# Patient Record
Sex: MALE | Race: BLACK OR AFRICAN AMERICAN | Employment: FULL TIME | URBAN - METROPOLITAN AREA
[De-identification: names, ages, dates, MRNs, and addresses within clinical notes are randomized per-mention and may not be internally consistent; named-entity substitution may affect disease eponyms.]

---

## 2017-07-15 ENCOUNTER — HOSPITAL ENCOUNTER (EMERGENCY)
Age: 27
Discharge: LEFT BEFORE DISCHARGE | End: 2017-07-15
Attending: EMERGENCY MEDICINE
Payer: SELF-PAY

## 2017-07-15 ENCOUNTER — HOSPITAL ENCOUNTER (INPATIENT)
Age: 27
LOS: 1 days | Discharge: HOME | DRG: 882 | End: 2017-07-16
Attending: PSYCHIATRY & NEUROLOGY | Admitting: STUDENT IN AN ORGANIZED HEALTH CARE EDUCATION/TRAINING PROGRAM
Payer: COMMERCIAL

## 2017-07-15 ENCOUNTER — HOSPITAL ENCOUNTER (EMERGENCY)
Age: 27
Discharge: PSYCHIATRIC OR SUBSTANCE USE FACILITY | End: 2017-07-15
Attending: EMERGENCY MEDICINE
Payer: SELF-PAY

## 2017-07-15 VITALS
DIASTOLIC BLOOD PRESSURE: 97 MMHG | TEMPERATURE: 99.2 F | HEART RATE: 98 BPM | RESPIRATION RATE: 18 BRPM | SYSTOLIC BLOOD PRESSURE: 158 MMHG | OXYGEN SATURATION: 100 %

## 2017-07-15 VITALS
RESPIRATION RATE: 16 BRPM | SYSTOLIC BLOOD PRESSURE: 118 MMHG | HEART RATE: 115 BPM | OXYGEN SATURATION: 100 % | DIASTOLIC BLOOD PRESSURE: 67 MMHG | TEMPERATURE: 98 F

## 2017-07-15 DIAGNOSIS — R45.851 SUICIDAL IDEATION: Primary | ICD-10-CM

## 2017-07-15 DIAGNOSIS — S51.812A LACERATION OF LEFT FOREARM, INITIAL ENCOUNTER: ICD-10-CM

## 2017-07-15 DIAGNOSIS — F10.920 ACUTE ALCOHOL INTOXICATION, UNCOMPLICATED (HCC): ICD-10-CM

## 2017-07-15 LAB
AMPHET UR QL SCN: NEGATIVE
ANION GAP BLD CALC-SCNC: 12 MMOL/L (ref 3–18)
BARBITURATES UR QL SCN: NEGATIVE
BASOPHILS # BLD AUTO: 0 K/UL (ref 0–0.06)
BASOPHILS # BLD: 0 % (ref 0–2)
BENZODIAZ UR QL: NEGATIVE
BUN SERPL-MCNC: 11 MG/DL (ref 7–18)
BUN/CREAT SERPL: 13 (ref 12–20)
CALCIUM SERPL-MCNC: 9.3 MG/DL (ref 8.5–10.1)
CANNABINOIDS UR QL SCN: NEGATIVE
CHLORIDE SERPL-SCNC: 107 MMOL/L (ref 100–108)
CO2 SERPL-SCNC: 22 MMOL/L (ref 21–32)
COCAINE UR QL SCN: NEGATIVE
CREAT SERPL-MCNC: 0.88 MG/DL (ref 0.6–1.3)
DIFFERENTIAL METHOD BLD: NORMAL
EOSINOPHIL # BLD: 0.1 K/UL (ref 0–0.4)
EOSINOPHIL NFR BLD: 1 % (ref 0–5)
ERYTHROCYTE [DISTWIDTH] IN BLOOD BY AUTOMATED COUNT: 13.2 % (ref 11.6–14.5)
ETHANOL SERPL-MCNC: 118 MG/DL (ref 0–3)
GLUCOSE SERPL-MCNC: 97 MG/DL (ref 74–99)
HCT VFR BLD AUTO: 41.6 % (ref 36–48)
HDSCOM,HDSCOM: NORMAL
HGB BLD-MCNC: 14.3 G/DL (ref 13–16)
LYMPHOCYTES # BLD AUTO: 25 % (ref 21–52)
LYMPHOCYTES # BLD: 2.4 K/UL (ref 0.9–3.6)
MCH RBC QN AUTO: 28 PG (ref 24–34)
MCHC RBC AUTO-ENTMCNC: 34.4 G/DL (ref 31–37)
MCV RBC AUTO: 81.4 FL (ref 74–97)
METHADONE UR QL: NEGATIVE
MONOCYTES # BLD: 0.5 K/UL (ref 0.05–1.2)
MONOCYTES NFR BLD AUTO: 5 % (ref 3–10)
NEUTS SEG # BLD: 6.5 K/UL (ref 1.8–8)
NEUTS SEG NFR BLD AUTO: 69 % (ref 40–73)
OPIATES UR QL: NEGATIVE
PCP UR QL: NEGATIVE
PLATELET # BLD AUTO: 294 K/UL (ref 135–420)
PMV BLD AUTO: 9.3 FL (ref 9.2–11.8)
POTASSIUM SERPL-SCNC: 3.6 MMOL/L (ref 3.5–5.5)
RBC # BLD AUTO: 5.11 M/UL (ref 4.7–5.5)
SODIUM SERPL-SCNC: 141 MMOL/L (ref 136–145)
WBC # BLD AUTO: 9.6 K/UL (ref 4.6–13.2)

## 2017-07-15 PROCEDURE — 99285 EMERGENCY DEPT VISIT HI MDM: CPT

## 2017-07-15 PROCEDURE — 80048 BASIC METABOLIC PNL TOTAL CA: CPT | Performed by: EMERGENCY MEDICINE

## 2017-07-15 PROCEDURE — 74011250637 HC RX REV CODE- 250/637: Performed by: EMERGENCY MEDICINE

## 2017-07-15 PROCEDURE — 80307 DRUG TEST PRSMV CHEM ANLYZR: CPT | Performed by: EMERGENCY MEDICINE

## 2017-07-15 PROCEDURE — 65220000005 HC RM SEMIPRIVATE PSYCH 3 OR 4 BED

## 2017-07-15 PROCEDURE — 85025 COMPLETE CBC W/AUTO DIFF WBC: CPT | Performed by: EMERGENCY MEDICINE

## 2017-07-15 RX ORDER — ZIPRASIDONE HYDROCHLORIDE 20 MG/1
20 CAPSULE ORAL AS NEEDED
Status: COMPLETED | OUTPATIENT
Start: 2017-07-15 | End: 2017-07-15

## 2017-07-15 RX ORDER — HYDROXYZINE PAMOATE 50 MG/1
50 CAPSULE ORAL
Status: DISCONTINUED | OUTPATIENT
Start: 2017-07-15 | End: 2017-07-16 | Stop reason: HOSPADM

## 2017-07-15 RX ORDER — LORAZEPAM 2 MG/ML
1-2 INJECTION INTRAMUSCULAR
Status: DISCONTINUED | OUTPATIENT
Start: 2017-07-15 | End: 2017-07-16 | Stop reason: HOSPADM

## 2017-07-15 RX ORDER — LORAZEPAM 1 MG/1
1-2 TABLET ORAL
Status: DISCONTINUED | OUTPATIENT
Start: 2017-07-15 | End: 2017-07-16 | Stop reason: HOSPADM

## 2017-07-15 RX ORDER — LORAZEPAM 1 MG/1
2 TABLET ORAL
Status: COMPLETED | OUTPATIENT
Start: 2017-07-15 | End: 2017-07-15

## 2017-07-15 RX ORDER — BENZTROPINE MESYLATE 1 MG/1
1-2 TABLET ORAL
Status: DISCONTINUED | OUTPATIENT
Start: 2017-07-15 | End: 2017-07-16 | Stop reason: HOSPADM

## 2017-07-15 RX ORDER — IBUPROFEN 600 MG/1
600 TABLET ORAL
Status: DISCONTINUED | OUTPATIENT
Start: 2017-07-15 | End: 2017-07-16 | Stop reason: HOSPADM

## 2017-07-15 RX ORDER — HALOPERIDOL 5 MG/ML
5 INJECTION INTRAMUSCULAR
Status: DISCONTINUED | OUTPATIENT
Start: 2017-07-15 | End: 2017-07-16 | Stop reason: HOSPADM

## 2017-07-15 RX ORDER — BENZTROPINE MESYLATE 1 MG/ML
1-2 INJECTION INTRAMUSCULAR; INTRAVENOUS
Status: DISCONTINUED | OUTPATIENT
Start: 2017-07-15 | End: 2017-07-16 | Stop reason: HOSPADM

## 2017-07-15 RX ORDER — TRAZODONE HYDROCHLORIDE 50 MG/1
50 TABLET ORAL
Status: DISCONTINUED | OUTPATIENT
Start: 2017-07-15 | End: 2017-07-16 | Stop reason: HOSPADM

## 2017-07-15 RX ORDER — HALOPERIDOL 5 MG/1
5 TABLET ORAL
Status: DISCONTINUED | OUTPATIENT
Start: 2017-07-15 | End: 2017-07-16 | Stop reason: HOSPADM

## 2017-07-15 RX ADMIN — ZIPRASIDONE HYDROCHLORIDE 20 MG: 20 CAPSULE ORAL at 14:23

## 2017-07-15 RX ADMIN — LORAZEPAM 2 MG: 1 TABLET ORAL at 14:23

## 2017-07-15 NOTE — ED NOTES
Maru MELÉNDEZ in department. Officers state they did not see patient along [address].  Officers states they will attempt to locate patient in neighborhoods surrounding hospital.

## 2017-07-15 NOTE — IP AVS SNAPSHOT
[address] Patient: Magui Hood MRN: [id number] :1990 You are allergic to the following No active allergies Recent Documentation Smoking Status Current Every Day Smoker Emergency Contacts Name Discharge Info Relation Home Work Mobile Karla Arauz DISCHARGE CAREGIVER [3] Other Relative [6] [phone] About your hospitalization You were admitted on:  July 15, 2017 You last received care in the:  [facility] You were discharged on:  2017 Unit phone number:  [phone] Why you were hospitalized Your primary diagnosis was:  Not on File Your diagnoses also included: Adjustment Disorder Providers Seen During Your Hospitalizations Provider Role Specialty Primary office phone Christine Bell MD Attending Provider Psychiatry [phone] Your Primary Care Physician (PCP) Primary Care Physician Office Phone Office Fax NONE ** None ** ** None ** Follow-up Information Follow up With Details Comments Contact Info Pt given address and phone # to the [facility] to follow up if needed. [place] [address] [address] [facility] [phone]. Pt given card with numbers to remember. Current Discharge Medication List  
  
START taking these medications Dose & Instructions Dispensing Information Comments Morning Noon Evening Bedtime  
 bacitracin ointment Commonly known as:  BACITRACIN Your last dose was: Your next dose is:    
   
   
 Apply  to affected area two (2) times a day. Indications: skin abrasion Quantity:  15 g Refills:  0 Where to Get Your Medications Information on where to get these meds will be given to you by the nurse or doctor. ! Ask your nurse or doctor about these medications  
  bacitracin ointment Discharge Instructions BEHAVIORAL HEALTH NURSING DISCHARGE NOTE The following personal items collected during your admission are returned to you:  
Dental Appliance: Dental Appliances: None Vision: Visual Aid: None Hearing Aid:   
Jewelry: Jewelry: None Clothing: Clothing: Footwear, Pants, Shirt Other Valuables: Other Valuables: Other (comment) (credit cards) Valuables sent to safe:   
 
 
PATIENT INSTRUCTIONS: 
 
 
 
Regular diet The discharge information has been reviewed with the patient. The patient verbalized understanding. Patient armband removed and shredded Discharge Orders None Introducing [facility]! Tyesha Pinon introduces [employer] patient portal. Now you can access parts of your medical record, email your doctor's office, and request medication refills online. 1. In your internet browser, go to [email or web address] 2. Click on the First Time User? Click Here link in the Sign In box. You will see the New Member Sign Up page. 3. Enter your [employer] Access Code exactly as it appears below. You will not need to use this code after youve completed the sign-up process. If you do not sign up before the expiration date, you must request a new code. · [employer] Access Code: [email or web address] Expires: 10/14/2017 10:24 AM 
 
4. Enter the last four digits of your Social Security Number (xxxx) and Date of Birth (mm/dd/yyyy) as indicated and click Submit. You will be taken to the next sign-up page. 5. Create a [employer] ID. This will be your [employer] login ID and cannot be changed, so think of one that is secure and easy to remember. 6. Create a [employer] password. You can change your password at any time. 7. Enter your Password Reset Question and Answer. This can be used at a later time if you forget your password. 8. Enter your e-mail address. You will receive e-mail notification when new information is available in [address] 9. Click Sign Up. You can now view and download portions of your medical record. 10. Click the Download Summary menu link to download a portable copy of your medical information. If you have questions, please visit the Frequently Asked Questions section of the [employer] website. Remember, [employer] is NOT to be used for urgent needs. For medical emergencies, dial 911. Now available from your iPhone and Android! General Information Please provide this summary of care documentation to your next provider. Patient Signature:  ____________________________________________________________ Date:  ____________________________________________________________  
  
[facility] Provider Signature:  ____________________________________________________________ Date:  ____________________________________________________________

## 2017-07-15 NOTE — IP AVS SNAPSHOT
Current Discharge Medication List  
  
START taking these medications Dose & Instructions Dispensing Information Comments Morning Noon Evening Bedtime  
 bacitracin ointment Commonly known as:  BACITRACIN Your last dose was: Your next dose is:    
   
   
 Apply  to affected area two (2) times a day. Indications: skin abrasion Quantity:  15 g Refills:  0 Where to Get Your Medications Information on where to get these meds will be given to you by the nurse or doctor. ! Ask your nurse or doctor about these medications  
  bacitracin ointment

## 2017-07-15 NOTE — ED NOTES
Pt admits to ETOH, states he doesn't know how much he drank, was just drinking vodka straight from the bottle.

## 2017-07-15 NOTE — ED NOTES
Received patient report from [email or web address]. Assuming care of patient at this time. Patient in bed with [employer] at bedside. Sitter at bedside.

## 2017-07-15 NOTE — ED PROVIDER NOTES
[facility] EMERGENCY DEPT      24 y.o. male with noted past medical history who presents to the emergency department by EMS and [employer] for self-inflicted laceration to left forearm. Per patient, he cut himself with a knife. He admits to self harm intention earlier today, but denies self harm intention during evaluation. He admits to drinking tonight. Patient denies history of depression or auditory hallucination. Patient is willing to accept intervention for mental health issue. Last tdap unknown. No other complaints. No current facility-administered medications for this encounter. No current outpatient prescriptions on file. History reviewed. No pertinent past medical history. Past Surgical History:   Procedure Laterality Date    HX APPENDECTOMY         History reviewed. No pertinent family history. Social History     Social History    Marital status: SINGLE     Spouse name: N/A    Number of children: N/A    Years of education: N/A     Occupational History    Not on file. Social History Main Topics    Smoking status: Current Every Day Smoker    Smokeless tobacco: Never Used    Alcohol use Yes    Drug use: No    Sexual activity: Not on file     Other Topics Concern    Not on file     Social History Narrative    No narrative on file       No Known Allergies    Patient's primary care provider (as noted in EPIC):  None    REVIEW OF SYSTEMS:    Constitutional:  Negative for diaphoresis. HENT:  Negative for congestion. Respiratory:  Negative for cough and shortness of breath. Cardiovascular:  Negative for chest pain and palpitations. Gastrointestinal:  Negative for diarrhea. Genitourinary:  Negative for flank pain. Musculoskeletal:  Negative for back pain. Skin:  Negative for pallor. Positive for laceration on left forearm. Neurological:  Negative for focal numbness, weakness or tingling. Negative for slurred speech.     Visit Vitals    BP (!) 158/97    Pulse 98  Temp 99.2 °F (37.3 °C)    Resp 18    SpO2 100%       PHYSICAL EXAM:    CONSTITUTIONAL:  Alert, in no apparent distress;  well developed;  well nourished. HEAD:  Normocephalic, atraumatic. EYES:  EOMI. Non-icteric sclera. Normal conjunctiva. ENTM:  Nose:  no rhinorrhea. Throat:  no erythema or exudate, mucous membranes moist.  NECK:  No JVD. Supple  RESPIRATORY:  Chest clear, equal breath sounds, good air movement. CARDIOVASCULAR:  Regular rate and rhythm. No murmurs, rubs, or gallops. GI:  Normal bowel sounds, abdomen soft and non-tender. No rebound or guarding. BACK:  Non-tender. UPPER EXT:  Normal inspection. Left anterior proximal forearm has 3.0 cm superficial laceration. Bleeding controlled. LOWER EXT:  No edema, no calf tenderness. Distal pulses intact. NEURO:  Moves all four extremities, and grossly normal motor exam.  SKIN:  No rashes;  Normal for age. PSYCH:  Alert and normal affect. DIFFERENTIAL DIAGNOSES/ MEDICAL DECISION MAKING:   Differential diagnoses/impression: Need to rule out obvious organic causes versus psychological etiology. Based on patient's presentation and lab work, I do not believe that there is an obvious organic etiology for the patient's suicidal ideation. I believe the patient needs psychiatric evaluation and treatment for the suicidal ideation.       ED COURSE:      Abnormal lab results from this emergency department encounter:  Labs Reviewed   ETHYL ALCOHOL - Abnormal; Notable for the following:        Result Value    ALCOHOL(ETHYL),SERUM 118 (*)     All other components within normal limits   CBC WITH AUTOMATED DIFF   METABOLIC PANEL, BASIC   DRUG SCREEN, URINE       Lab values for this patient within approximately the last 12 hours:  Recent Results (from the past 12 hour(s))   DRUG SCREEN, URINE    Collection Time: 07/15/17  4:30 AM   Result Value Ref Range    BENZODIAZEPINE NEGATIVE  NEG      BARBITURATES NEGATIVE  NEG      THC (TH-CANNABINOL) NEGATIVE  NEG      OPIATES NEGATIVE  NEG      PCP(PHENCYCLIDINE) NEGATIVE  NEG      COCAINE NEGATIVE  NEG      AMPHETAMINES NEGATIVE  NEG      METHADONE NEGATIVE       HDSCOM (NOTE)    CBC WITH AUTOMATED DIFF    Collection Time: 07/15/17  5:22 AM   Result Value Ref Range    WBC 9.6 4.6 - 13.2 K/uL    RBC 5.11 4.70 - 5.50 M/uL    HGB 14.3 13.0 - 16.0 g/dL    HCT 41.6 36.0 - 48.0 %    MCV 81.4 74.0 - 97.0 FL    MCH 28.0 24.0 - 34.0 PG    MCHC 34.4 31.0 - 37.0 g/dL    RDW 13.2 11.6 - 14.5 %    PLATELET 895 813 - 243 K/uL    MPV 9.3 9.2 - 11.8 FL    NEUTROPHILS 69 40 - 73 %    LYMPHOCYTES 25 21 - 52 %    MONOCYTES 5 3 - 10 %    EOSINOPHILS 1 0 - 5 %    BASOPHILS 0 0 - 2 %    ABS. NEUTROPHILS 6.5 1.8 - 8.0 K/UL    ABS. LYMPHOCYTES 2.4 0.9 - 3.6 K/UL    ABS. MONOCYTES 0.5 0.05 - 1.2 K/UL    ABS. EOSINOPHILS 0.1 0.0 - 0.4 K/UL    ABS. BASOPHILS 0.0 0.0 - 0.06 K/UL    DF AUTOMATED     METABOLIC PANEL, BASIC    Collection Time: 07/15/17  5:22 AM   Result Value Ref Range    Sodium 141 136 - 145 mmol/L    Potassium 3.6 3.5 - 5.5 mmol/L    Chloride 107 100 - 108 mmol/L    CO2 22 21 - 32 mmol/L    Anion gap 12 3.0 - 18 mmol/L    Glucose 97 74 - 99 mg/dL    BUN 11 7.0 - 18 MG/DL    Creatinine 0.88 0.6 - 1.3 MG/DL    BUN/Creatinine ratio 13 12 - 20      GFR est AA >60 >60 ml/min/1.73m2    GFR est non-AA >60 >60 ml/min/1.73m2    Calcium 9.3 8.5 - 10.1 MG/DL   ETHYL ALCOHOL    Collection Time: 07/15/17  5:22 AM   Result Value Ref Range    ALCOHOL(ETHYL),SERUM 118 (H) 0 - 3 MG/DL       Radiologist and cardiologist interpretations if available at time of this note:  No results found. Medication(s) ordered for patient during this emergency visit encounter:  Medications - No data to display    ED COURSE:  The patient has no active medical issues. I believe that the patient is medically cleared for admission to a psychiatric unit if this is deemed appropriate by the crisis staff after their evaluation of the patient.      Patient eloped before laceration could be repaired and before Tele-psych evaluation. IMPRESSION AND MEDICAL DECISION MAKING:  Based upon the patient's presentation with noted HPI and PE, along with the work up done in the emergency department, I believe that the patient is having suicidal ideation that MAY require admission and further evaluation on a psychiatric/ behavioral medicine unit. Condition:  Stable    DIAGNOSIS:  1. Suicidal ideation. 2.  Laceration, left forearm. 3.  Acute alcohol intoxication. 4.  Eloped patient. PLAN:   1. Eloped. STEVENSON Fox, acting as a scribe for and in the presence of Dr. Jose Marquis M.D  Signed By: Shaun Jorgensen, scribe for Dr. Jose Marquis M.D    Provider Attestation:  If a scribe was utilized in generation of this patient record, I personally performed the services described in the documentation, reviewed the documentation, as recorded by the scribe in my presence, and it accurately records the patient's history of presenting illness, review of systems, patient physical examination, and procedures performed by me as the attending physician. Satr Babcock M.D.   AB Board Certified Emergency Physician  7/15/2017.  04:41 AM

## 2017-07-15 NOTE — ED NOTES
Pt changes into paper scrubs which were small on him so he was provided with a gown. Pt belongings bagged, checked with security, and placed into locker #1.

## 2017-07-15 NOTE — ED TRIAGE NOTES
Patient returned to collect belongings. Per pm charge nurse that patient reported that he cut his arm to harm himself and will jump off of a bridge the next time because the cut was too painful. Per pm nurse Josy Rivas the patient also states he hopes he dies from an infection that from the cut on his arm and patient eloped during night shift. PD was called per night she staff  These concerns were discussed the patient and the patient states \"this is my life and if I want to kill myself I can\". Patient demands his belongings he left last pm. This was discussed with the emergency attending Dr. Quinn Prado.  PD called for possible ECO and patient placed in EKG room with security standing by awaiting PD arrival.

## 2017-07-15 NOTE — IP AVS SNAPSHOT
Summary of Care Report The Summary of Care report has been created to help improve care coordination. Users with access to [employer] or [address] (Web-based application) may access additional patient information including the Discharge Summary. If you are not currently a [address] user and need more information, please call the number listed below in the [place] [address] section and ask to be connected with Medical Records. Facility Information Name Address Phone [address] [facility]  [address] [facility] [address] [phone] Patient Information Patient Name Sex KRYSTA Pantoja ([id number]) Male 1990 Discharge Information Admitting Provider Service Area Unit Jo Klein MD / [address] [phone] Discharge Provider Discharge Date/Time Discharge Disposition Destination (none) 2017 (Pending) AHR (none) Patient Language Language ENGLISH [13] Hospital Problems as of 2017  Never Reviewed Class Noted - Resolved Last Modified POA Active Problems Adjustment disorder  2017 - Present 2017 by Jo Klein MD Unknown Entered by Jo Klein MD  
  
You are allergic to the following No active allergies Current Discharge Medication List  
  
START taking these medications Dose & Instructions Dispensing Information Comments  
 bacitracin ointment Commonly known as:  BACITRACIN Apply  to affected area two (2) times a day. Indications: skin abrasion Quantity:  15 g Refills:  0 Follow-up Information Follow up With Details Comments Contact Info Pt given address and phone # to the [facility] to follow up if needed. [place] [phone] [facility] [phone]. Pt given card with numbers to remember. Discharge Instructions BEHAVIORAL HEALTH NURSING DISCHARGE NOTE The following personal items collected during your admission are returned to you:  
Dental Appliance: Dental Appliances: None Vision: Visual Aid: None Hearing Aid:   
Jewelry: Jewelry: None Clothing: Clothing: Footwear, Pants, Shirt Other Valuables: Other Valuables: Other (comment) (credit cards) Valuables sent to safe:   
 
 
PATIENT INSTRUCTIONS: 
 
 
 
Regular diet The discharge information has been reviewed with the patient. The patient verbalized understanding. Patient armband removed and shredded Chart Review Routing History No Routing History on File

## 2017-07-15 NOTE — ED NOTES
Patient refusing to change into scrubs. [employer] at bedside checked patient for any weapons. Karla MELO charge nurse aware patient is refusing scrubs. Patient currently under ECO and police will remain at bedside with patient.  Sitter also at bedside

## 2017-07-15 NOTE — ED NOTES
Pt walked out of ER and was pacing in front of ambulance bay doors. Security escorted pt back inside. Pt states Justyna Boyd is taking so long to get me out of here? I've been here for like 30 minutes already. \" Pt being rude and argumentative with staff stating he is just emotional and there is nothing wrong with him.

## 2017-07-15 NOTE — ED NOTES
Lisa West taken out of belongings bag in locker #1 and handed to Courtney Kumar. With permission from pt, pt states it is his fathers truck.  [place] given back into possession of owner of truck

## 2017-07-15 NOTE — ED NOTES
Per Flora Syed from [facility] the patient will be TDO'd and [facility] will hold a male bed pending documentation and medical clearance

## 2017-07-15 NOTE — ED PROVIDER NOTES
HPI Comments: 9:19 AM George Bridges is a 24 y.o. male with no known past medical hx who presents to the ED ECO for suicidal ideation. The pt was seen in the ED last night for cutting his right forearm to harm himself. While in the ED he stated that the next time he was going to jump off a bridge because cutting his arm hurt too much. The pt eloped during the night and returned this morning to  belongings but [employer] was called. The patient denies current SI but does confirm that he tried to harm himself by cutting his arm. He reports ETOH consumption since elopement. The history is provided by the patient. No past medical history on file. Past Surgical History:   Procedure Laterality Date    HX APPENDECTOMY           No family history on file. Social History     Social History    Marital status: SINGLE     Spouse name: N/A    Number of children: N/A    Years of education: N/A     Occupational History    Not on file. Social History Main Topics    Smoking status: Current Every Day Smoker    Smokeless tobacco: Never Used    Alcohol use Yes    Drug use: No    Sexual activity: Not on file     Other Topics Concern    Not on file     Social History Narrative    No narrative on file         ALLERGIES: Review of patient's allergies indicates no known allergies. Review of Systems   Constitutional: Negative for diaphoresis and fever. HENT: Negative for ear pain, rhinorrhea and trouble swallowing. Eyes: Negative for visual disturbance. Respiratory: Negative for cough and shortness of breath. Cardiovascular: Negative for chest pain and leg swelling. Gastrointestinal: Negative for abdominal pain, blood in stool, diarrhea, nausea and vomiting. Genitourinary: Negative for difficulty urinating, flank pain and hematuria. Musculoskeletal: Negative for back pain and neck pain. Skin: Negative for rash. Neurological: Negative for dizziness, weakness, numbness and headaches. Hematological: Negative. Psychiatric/Behavioral: Negative. All other systems reviewed and are negative. Vitals:    07/15/17 0905   BP: 119/73   Pulse: 99   Resp: 18   Temp: 98.3 °F (36.8 °C)            Physical Exam   Constitutional: He is oriented to person, place, and time. He appears well-developed and well-nourished. No distress. HENT:   Head: Normocephalic and atraumatic. Mouth/Throat: Oropharynx is clear and moist.   Eyes: Conjunctivae and EOM are normal. Pupils are equal, round, and reactive to light. No scleral icterus. Neck: Normal range of motion. Neck supple. Cardiovascular: Normal rate, regular rhythm and normal heart sounds. No murmur heard. Pulmonary/Chest: Effort normal and breath sounds normal. No respiratory distress. Abdominal: Soft. Bowel sounds are normal. He exhibits no distension. There is no tenderness. Musculoskeletal: He exhibits no edema. 2 cm superficial laceration roughly 10 hours old   Lymphadenopathy:     He has no cervical adenopathy. Neurological: He is alert and oriented to person, place, and time. Coordination normal.   Skin: Skin is warm and dry. No rash noted. Psychiatric: He has a normal mood and affect. His behavior is normal.   Nursing note and vitals reviewed. MDM  Number of Diagnoses or Management Options  Suicidal ideation:   Diagnosis management comments: No past psych hx however increased si last night with laceration to L forearm stated he was going to jump off bridge eloped last night however returned early this am ECO enacted    Pt to be T[facility] to [facility] for further care     ED Course       Procedures      Vitals:  Patient Vitals for the past 12 hrs:   Temp Pulse Resp BP   07/15/17 0905 98.3 °F (36.8 °C) 99 18 119/73   99% on RA, indicating adequate oxygenation.         Medications ordered:   Medications   ziprasidone (GEODON) capsule 20 mg (20 mg Oral Given 7/15/17 1423)   LORazepam (ATIVAN) tablet 2 mg (2 mg Oral Given 7/15/17 1423) Lab findings:  Recent Results (from the past 12 hour(s))   DRUG SCREEN, URINE    Collection Time: 07/15/17  4:30 AM   Result Value Ref Range    BENZODIAZEPINE NEGATIVE  NEG      BARBITURATES NEGATIVE  NEG      THC (TH-CANNABINOL) NEGATIVE  NEG      OPIATES NEGATIVE  NEG      PCP(PHENCYCLIDINE) NEGATIVE  NEG      COCAINE NEGATIVE  NEG      AMPHETAMINES NEGATIVE  NEG      METHADONE NEGATIVE       HDSCOM (NOTE)    CBC WITH AUTOMATED DIFF    Collection Time: 07/15/17  5:22 AM   Result Value Ref Range    WBC 9.6 4.6 - 13.2 K/uL    RBC 5.11 4.70 - 5.50 M/uL    HGB 14.3 13.0 - 16.0 g/dL    HCT 41.6 36.0 - 48.0 %    MCV 81.4 74.0 - 97.0 FL    MCH 28.0 24.0 - 34.0 PG    MCHC 34.4 31.0 - 37.0 g/dL    RDW 13.2 11.6 - 14.5 %    PLATELET 779 421 - 551 K/uL    MPV 9.3 9.2 - 11.8 FL    NEUTROPHILS 69 40 - 73 %    LYMPHOCYTES 25 21 - 52 %    MONOCYTES 5 3 - 10 %    EOSINOPHILS 1 0 - 5 %    BASOPHILS 0 0 - 2 %    ABS. NEUTROPHILS 6.5 1.8 - 8.0 K/UL    ABS. LYMPHOCYTES 2.4 0.9 - 3.6 K/UL    ABS. MONOCYTES 0.5 0.05 - 1.2 K/UL    ABS. EOSINOPHILS 0.1 0.0 - 0.4 K/UL    ABS.  BASOPHILS 0.0 0.0 - 0.06 K/UL    DF AUTOMATED     METABOLIC PANEL, BASIC    Collection Time: 07/15/17  5:22 AM   Result Value Ref Range    Sodium 141 136 - 145 mmol/L    Potassium 3.6 3.5 - 5.5 mmol/L    Chloride 107 100 - 108 mmol/L    CO2 22 21 - 32 mmol/L    Anion gap 12 3.0 - 18 mmol/L    Glucose 97 74 - 99 mg/dL    BUN 11 7.0 - 18 MG/DL    Creatinine 0.88 0.6 - 1.3 MG/DL    BUN/Creatinine ratio 13 12 - 20      GFR est AA >60 >60 ml/min/1.73m2    GFR est non-AA >60 >60 ml/min/1.73m2    Calcium 9.3 8.5 - 10.1 MG/DL   ETHYL ALCOHOL    Collection Time: 07/15/17  5:22 AM   Result Value Ref Range    ALCOHOL(ETHYL),SERUM 118 (H) 0 - 3 MG/DL       EKG interpretation by ED Physician:      X-Ray, CT or other radiology findings or impressions:  No orders to display       Progress notes, Consult notes or additional Procedure notes:   10:00 AM Pt seen by [facility] and pursuing TDO. Reevaluation of patient:       Disposition:  Diagnosis:   1. Suicidal ideation        Disposition: Transfer to [facility] TDO    Follow-up Information     None           Patient's Medications    No medications on file         SCRIBE ATTESTATION STATEMENT  Documented by: Nilesh Ambriz scribing for, and in the presence of, Alfredo Faria MD 07/15/17 3:55 PM     Signed by: Vijay Ardon, 07/15/17 10:00 AM     PROVIDER ATTESTATION STATEMENT  I personally performed the services described in the documentation, reviewed the documentation, as recorded by the scribe in my presence, and it accurately and completely records my words and actions.   Alfredo Faria MD

## 2017-07-15 NOTE — BH NOTES
Pt admitted as [facility] T[facility] from [facility] after a suicidal gesture of cutting left forearm. Pt arrived to unit accompanied by security. Pt compliant with admission process. Pt malodorous. Pt is guarded and appears to be minimizing ETOH intake and suicidal gesture. Pt stated recent stressor is his daughter's mother recently moved daughter to [place] and told him he had no rights. Pt denies history of depression and denies psych history. Pt denies history of abuse. Pt stated he consumes \"one cup of vodka daily. \" Pt denies previous history of suicide attempts. Pt denies history of aggressive behaviors. Pt does endorse history of GERD; denies allergies and daily medications. Pt has small self inflicted laceration to left upper forearm about an inch in width. Steri strips in place. No signs and symptoms of infection present. Pt stated he used a \"regular knife\"     Pt was oriented to unit rules and expectations and verbalized understanding. TDO process reviewed with pt who verbalized understanding. Safety search for contraband conducted. Pt placed on suicide precautions where rounds will be conducted Q 15 mins for safety.

## 2017-07-15 NOTE — ED TRIAGE NOTES
Pt presents by EMS and [employer] with self inflicted laceration to L forearm. States he has been dealing with depression for about 3 years now but is unable to [facility] a psychiatrist like you white people. \" Pt states he thought of several ways to end his life including hanging himself and jumping off a bridge \"but they all sound painful so I'm good. \" Pt states this attempt has made him realize he doesn't want to hurt himself after all. Denies HI. States he doesn't want to stay here.

## 2017-07-15 NOTE — ED NOTES
CSB at bedside.  Patients keys taken by [employer] at this time and placed in patients belonging bag in LOCKER #1

## 2017-07-16 VITALS
SYSTOLIC BLOOD PRESSURE: 141 MMHG | DIASTOLIC BLOOD PRESSURE: 82 MMHG | TEMPERATURE: 97.6 F | RESPIRATION RATE: 19 BRPM | HEART RATE: 81 BPM

## 2017-07-16 PROBLEM — F43.20 ADJUSTMENT DISORDER: Status: ACTIVE | Noted: 2017-07-16

## 2017-07-16 RX ORDER — BACITRACIN ZINC 500 UNIT/G
OINTMENT (GRAM) TOPICAL 2 TIMES DAILY
Status: DISCONTINUED | OUTPATIENT
Start: 2017-07-16 | End: 2017-07-16 | Stop reason: HOSPADM

## 2017-07-16 RX ORDER — BACITRACIN ZINC 500 UNIT/G
OINTMENT (GRAM) TOPICAL 2 TIMES DAILY
Qty: 15 G | Refills: 0 | Status: SHIPPED | OUTPATIENT
Start: 2017-07-16

## 2017-07-16 NOTE — H&P
Psychiatry History and Physical    Subjective:     Date of Evaluation:  7/16/2017    Reason for Referral:  Kristine Sen was referred to the examiners from ER/[facility] for suicidal gesture-cut self    History of Presenting Problem: 26y/o AA male in NAD well developed and nourished. TDO admit. Denies ETOH problems or SI. Medical problems; Depression, cut self to left FA-antecubital area    There are no active problems to display for this patient. No past medical history on file. Past Surgical History:   Procedure Laterality Date    HX APPENDECTOMY         No family history on file.    Social History   Substance Use Topics    Smoking status: Current Every Day Smoker    Smokeless tobacco: Never Used    Alcohol use Yes     Prior to Admission medications    Not on File     No Known Allergies     Review of Systems - History obtained from chart review and the patient  General ROS: positive for  - sleep disturbance  Psychological ROS: positive for - depression and sleep disturbances  Respiratory ROS: no cough, shortness of breath, or wheezing  Cardiovascular ROS: no chest pain or dyspnea on exertion  Gastrointestinal ROS: no abdominal pain, change in bowel habits, or black or bloody stools  Genito-Urinary ROS: no dysuria, trouble voiding, or hematuria  Musculoskeletal ROS: negative  Neurological ROS: no TIA or stroke symptoms  Dermatological ROS: negative      Objective:     Patient Vitals for the past 8 hrs:   BP Temp Pulse Resp   07/16/17 0745 141/82 97.6 °F (36.4 °C) 81 19       Mental Status exam: WNL except for    Sensorium  oriented to time, place and person   Orientation person, place, time/date and situation   Relations cooperative   Eye Contact appropriate   Appearance:  age appropriate and within normal Limits   Motor Behavior:  gait stable and within normal limits   Speech:  normal volume   Vocabulary average   Thought Process: logical   Thought Content free of delusions and free of hallucinations   Suicidal ideations no plan  and no intention   Homicidal ideations no plan  and no intention   Mood:  depressed   Affect:  depressed   Memory recent  adequate   Memory remote:  adequate   Concentration:  adequate   Abstraction:  concrete   Insight:  good   Reliability good   Judgment:  good           Physical Exam:   Visit Vitals    /82    Pulse 81    Temp 97.6 °F (36.4 °C)    Resp 19     General appearance: alert, cooperative, no distress, appears stated age  Head: Normocephalic, without obvious abnormality, atraumatic  Eyes: negative  Ears: normal TM's and external ear canals AU  Nose: Nares normal. Septum midline. Mucosa normal. No drainage or sinus tenderness. Throat: Lips, mucosa, and tongue normal. Teeth and gums normal  Neck: supple, symmetrical, trachea midline, no adenopathy, thyroid: not enlarged, symmetric, no tenderness/mass/nodules, no carotid bruit and no JVD  Back: symmetric, no curvature. ROM normal. No CVA tenderness. Lungs: clear to auscultation bilaterally  Chest wall: no tenderness  Heart: regular rate and rhythm, S1, S2 normal, no murmur, click, rub or gallop  Abdomen: soft, non-tender. Bowel sounds normal. No masses,  no organomegaly  Extremities: extremities normal, atraumatic, no cyanosis or edema  Pulses: 2+ and symmetric  Skin: Skin color, texture, turgor normal. No rashes or lesions or superficial lac to left antecubital area-has 2 steri strips noted-intact  Lymph nodes: Cervical, supraclavicular, and axillary nodes normal.  Neurologic: Grossly normal        Impression:    Active Problems:    * No active hospital problems. *        Plan:     Recommendations for Treatment/Conditions:  Psychiatric treatment recommended while in hospital  Admit to Psych services for Suicidal gesture-cut self                                                  Depression    Referral To: Inpatient psychiatric care    Competency Statement:    It is recommended that the family or other concerned individuals be consulted for substituted judgement if deemed incompetent.  [email or web address]      [employer], NP   7/16/2017 9:07 AM

## 2017-07-16 NOTE — H&P
Psychiatric Intake Note    Date: 17   Patient Name: George Bridges  : 1990  MRN: [id number]  Hospital Day: 2    CC: \"I'm ready to go home. \"    HISTORY OF PRESENT ILLNESS:   Patient is a 25 yo AAM with no past psych hx who presents on TDO by police for suicide attempt of superficial laceration to upper L forearm in context of alcohol use and fight with girlfriend. Patient this morning is doing well, calm, cooperative. He says he \"needs a cigarette\" and \"needs to get out of here. \" He says he was frustrated that he's been providing for girlfriend and two kids for past 3 years and asked girlfriend to make one week's rent yesterday and she \"flipped out and up and left\" him taking the two children to [place]. Of note, patient spoke with girlfriend over the phone while on the unit last night and it was a calm conversation. Patient says he was \"overwhelmed\" and started drinking and just \"wanted to feel\" and so, cut himself, but stopped \"because it hurt. \" He denies wanting to end his life and says he's never done anything like this before. He says it was \"stupid\" and now, he just wants to go home. Denies SI/HI at this time. Patient is future oriented, says he's enrolled in school to further education and wants to get back to work so he can make enough money to live on his own again. For now, he will go stay with father and grandmother     Outside of event yesterday, has been sleeping well, eating well, denies depressive ssx, no psychosis, no prabhakar. No hx of impulsivity/ violence/ suicide/ self injurious behaviors. Denies illicit substance use. Spoke with patient's father who confirms that patient has never done anything like this before. He's \"usually very level-headed. I was concerned when I heard about it. This isn't like him at all. \" The father confirms that patient is to stay with him at time of discharge and father and grandmother are home today to spend time with father.       PSYCHIATRIC HISTORY:  Never seen mental health providers, therapist, psychiatrist before  No hospitalizations  No hx of impulsivity/ violence/ suicide/ self injurious behaviors. Denies illicit substance use. PAST MEDICAL/ SURGICAL HISTORY:  None     ALLERGIES: NKDA    FAMILY HISTORY OF MENTAL ILLNESS:   Mother side: unknown  Father's side: unknown  Siblings: unknown    SOCIAL HISTORY:  Current Living Situation: was living in rental, but now going to stay with father and grandmother  Relationship status:  from gf recently  Children: has 1 stepchild, 1 bio child  Employment: works as   Education: h.s.  GED  Legal: none  Abuse History: denies    SUBSTANCE USE:  Remote hx of cannabis use, drinks 1-2 drinks/ week (if that, he says)    REVIEW OF SYSTEMS: reviewed 10 organ systems- negative, except as noted in HPI    Patient Vitals for the past 24 hrs:   Temp Pulse Resp BP SpO2   07/06/17 0758 98.3 °F (36.8 °C) (!) 103 18 (!) 166/115 -   07/05/17 1958 98 °F (36.7 °C) 100 18 (!) 158/114 -   07/05/17 1322 99 °F (37.2 °C) (!) 103 18 (!) 159/116 99 %       MENTAL STATUS EXAM:  Appearance: Dressed in casual clothes with fair grooming and hygiene  Behavior: Cooperative with good eye contact  Motor: No psychomotor agitation/retardation  Speech: Normal rate, tone and volume  Mood: \"good now\"  Affect: euthymic, calm  Thought Process: linear, goal-directed  Thought Content: Denies SI and HI  Perception: Denies AH or VH  Concentration: fair  Memory: fair  Cognition: Alert and oriented  Insight: fair  Judgment: fair    RISK ASSESSMENT:   Prior Attempts: no noted prior  Lethality of Attempts: none noted prior  Current Ideation/Plan: no  Weapons at Home: no  Alcohol/Drug Use: +Etoh on arrival  Protective Factors: limited, no supportive family or peer support  Future Orientation: yes    ASSESSMENT: Patient is a 23 yo AAM with no past psych hx who presents on TDO by police for suicide attempt of superficial laceration to upper L forearm in context of alcohol use and fight with girlfriend. Patient's behavior largely explained by acute stressor of girlfriend walking out and in the context of alcohol use. Risk factors include recent self injurious behavior, male gender, and relationship separation; though protective factors include good support system in family, has housing, has full time job, future orientation, no hx of psych disorders, no hx of self injurious behaviors/ suicide attempts/ suicidal ideations, and strong Denominational convictions forbidding suicide as an option. Additionally, father is willing to  patient today, hide knives in home and all weapons and pills. Diagnoses:  Adjustment disorder   Alcohol use disorder    Plan:  - Pt requests discharge, not willing to take meds or f/u with outpatient psychiatrist because he \"doesn't believe\" in mental illness; at this time, father feels safe with patient returning home and no active ssx of mood or psychotic disorder  - Dispo- d/c patient home with father, provide resources for outpatient behavioral health    Jacqueline Quigley MD

## 2017-07-16 NOTE — DISCHARGE INSTRUCTIONS
BEHAVIORAL HEALTH NURSING DISCHARGE NOTE      The following personal items collected during your admission are returned to you:   Dental Appliance: Dental Appliances: None  Vision: Visual Aid: None  Hearing Aid:    Jewelry: Jewelry: None  Clothing: Clothing: Footwear, Pants, Shirt  Other Valuables: Other Valuables: Other (comment) (credit cards)  Valuables sent to safe:        PATIENT INSTRUCTIONS:        Regular diet        The discharge information has been reviewed with the patient. The patient verbalized understanding.       Patient armband removed and shredded

## 2017-07-16 NOTE — BH NOTES
Discharge instructions explained, copy given to pt. Discharged at [phone] with his belongings. Pt father picking him up, waiting in the lobby.

## 2017-07-17 NOTE — DISCHARGE SUMMARY
Psychiatric Discharge Summary    Date: 17   Patient Name: Julianna King  : 1990  MRN: [id number]  Admission Date: 7/15/2017  Discharge Date:     HISTORY OF PRESENT ILLNESS:   Patient is a 25 yo AAM with no past psych hx who presents on TDO by police for suicide attempt of superficial laceration to upper L forearm in context of alcohol use and fight with girlfriend.     Patient this morning is doing well, calm, cooperative. He says he \"needs a cigarette\" and \"needs to get out of here. \" He says he was frustrated that he's been providing for girlfriend and two kids for past 3 years and asked girlfriend to make one week's rent yesterday and she \"flipped out and up and left\" him taking the two children to [place]. Of note, patient spoke with girlfriend over the phone while on the unit last night and it was a calm conversation. Patient says he was \"overwhelmed\" and started drinking and just \"wanted to feel\" and so, cut himself, but stopped \"because it hurt. \" He denies wanting to end his life and says he's never done anything like this before. He says it was \"stupid\" and now, he just wants to go home. Denies SI/HI at this time.      Patient is future oriented, says he's enrolled in school to further education and wants to get back to work so he can make enough money to live on his own again. For now, he will go stay with father and grandmother      Outside of event yesterday, has been sleeping well, eating well, denies depressive ssx, no psychosis, no prabhakar. No hx of impulsivity/ violence/ suicide/ self injurious behaviors. Denies illicit substance use.      Spoke with patient's father who confirms that patient has never done anything like this before. He's \"usually very level-headed. I was concerned when I heard about it. This isn't like him at all. \" The father confirms that patient is to stay with him at time of discharge and father and grandmother are home today to spend time with father.     HOSPITAL COURSE:   Once on the unit, patient sobered up, was calm, cooperative. He was not agreeable to treatment. Admitted what he did was stupid and unlike him. Said he didn't believe in mental illness and did not want to take meds. Collateral obtained from father who felt safe to bring patient home with him. MENTAL STATUS EXAM:  Orientation: oriented to person, place, time, and situation  Appearance: Dressed in casual clothes with good grooming and hygiene  Behavior: good eye contact, guarded but cooperative  Motor: No psychomotor agitation/retardation  Speech: Normal rate, tone and volume  Mood: \"good\"  Affect: constricted  Thought Process: linear, goal-directed  Thought Content: Denies SI and HI  Perception: Denies AH or VH  Concentration: fair  Memory: fair  Cognition: Alert and oriented  Insight: fair  Judgment: fair    ASSESSMENT at time of discharge:   Patient is a 25 yo AAM with no past psych hx who presented on TDO by police for suicidal gesture of superficial laceration to upper L forearm in context of alcohol use and fight with girlfriend. Actions likely due to alcohol use and acute stressor, not due in part to underlying depressive or manic or psychotic episode. Diagnoses:  Adjustment disorder     Discharge Instructions:  1. Patient given outpatient behavioral health information  2. If you feel unsafe or begin experiencing suicidal thoughts again, please call 9-1-1 or return to the nearest emergency department. Disposition:  Home accompanied by father with outpatient follow-up      Jacqueline Degroot MD

## 2020-01-15 ENCOUNTER — APPOINTMENT (OUTPATIENT)
Dept: GENERAL RADIOLOGY | Age: 30
End: 2020-01-15
Attending: PHYSICIAN ASSISTANT
Payer: SELF-PAY

## 2020-01-15 ENCOUNTER — HOSPITAL ENCOUNTER (EMERGENCY)
Age: 30
Discharge: HOME | End: 2020-01-15
Attending: EMERGENCY MEDICINE
Payer: SELF-PAY

## 2020-01-15 VITALS
TEMPERATURE: 101.3 F | SYSTOLIC BLOOD PRESSURE: 133 MMHG | HEART RATE: 88 BPM | DIASTOLIC BLOOD PRESSURE: 87 MMHG | RESPIRATION RATE: 14 BRPM | OXYGEN SATURATION: 97 %

## 2020-01-15 DIAGNOSIS — J10.1 INFLUENZA B: Primary | ICD-10-CM

## 2020-01-15 LAB
FLUAV AG NPH QL IA: NEGATIVE
FLUBV AG NOSE QL IA: POSITIVE

## 2020-01-15 PROCEDURE — 71046 X-RAY EXAM CHEST 2 VIEWS: CPT

## 2020-01-15 PROCEDURE — 87804 INFLUENZA ASSAY W/OPTIC: CPT

## 2020-01-15 PROCEDURE — 74011250637 HC RX REV CODE- 250/637: Performed by: PHYSICIAN ASSISTANT

## 2020-01-15 PROCEDURE — 87081 CULTURE SCREEN ONLY: CPT

## 2020-01-15 PROCEDURE — 99283 EMERGENCY DEPT VISIT LOW MDM: CPT

## 2020-01-15 RX ORDER — ACETAMINOPHEN 325 MG/1
650 TABLET ORAL
Status: COMPLETED | OUTPATIENT
Start: 2020-01-15 | End: 2020-01-15

## 2020-01-15 RX ORDER — ONDANSETRON 4 MG/1
4 TABLET, ORALLY DISINTEGRATING ORAL
Qty: 10 TAB | Refills: 0 | Status: SHIPPED | OUTPATIENT
Start: 2020-01-15

## 2020-01-15 RX ORDER — IBUPROFEN 600 MG/1
600 TABLET ORAL
Status: COMPLETED | OUTPATIENT
Start: 2020-01-15 | End: 2020-01-15

## 2020-01-15 RX ORDER — BENZONATATE 100 MG/1
100 CAPSULE ORAL
Qty: 21 CAP | Refills: 0 | Status: SHIPPED | OUTPATIENT
Start: 2020-01-15 | End: 2020-01-22

## 2020-01-15 RX ORDER — IBUPROFEN 800 MG/1
800 TABLET ORAL
Qty: 20 TAB | Refills: 0 | Status: SHIPPED | OUTPATIENT
Start: 2020-01-15

## 2020-01-15 RX ADMIN — ACETAMINOPHEN 650 MG: 325 TABLET ORAL at 17:52

## 2020-01-15 RX ADMIN — IBUPROFEN 600 MG: 600 TABLET, FILM COATED ORAL at 17:52

## 2020-01-15 NOTE — ED NOTES
KRYSTIN Jasmine reviewed discharge instructions with the patient. The patient verbalized understanding.

## 2020-01-15 NOTE — LETTER
[address] [place] [address] [facility] EMERGENCY DEPT 
[address] [facility] [id number] [phone] Work/School Note Date: 1/15/2020 To Whom It May concern: 
 
Pina Zhou was seen and treated today in the emergency room by the following provider(s): 
Physician Assistant: KRYSTIN Vargas. Pina Zhou may return to work on 1/20/2020. Sincerely, Lalitha Groves RN

## 2020-01-15 NOTE — ED PROVIDER NOTES
EMERGENCY DEPARTMENT HISTORY AND PHYSICAL EXAM      Date: 1/15/2020  Patient Name: Nathaly Perla    History of Presenting Illness     Chief Complaint   Patient presents with    Sore Throat       History Provided By: Patient    HPI: Nathaly Perla, 34 y.o. male no significant PMHx presents ambulatory to the ED with cc of sore throat with assoc productive cough and maylgias x 1 day. Subjective fevers. Denies ear pain, nausea/vomiting, abdominal pain, diarrhea. Patient has not taken anything for symptoms. Patient did not receive flu shot this year. Patient states no one around him with similar symptoms. There are no other complaints, changes, or physical findings at this time. PCP: None    No current facility-administered medications on file prior to encounter. Current Outpatient Medications on File Prior to Encounter   Medication Sig Dispense Refill    bacitracin (BACITRACIN) ointment Apply  to affected area two (2) times a day. Indications: skin abrasion 15 g 0       Past History     Past Medical History:  History reviewed. No pertinent past medical history. Past Surgical History:  Past Surgical History:   Procedure Laterality Date    HX APPENDECTOMY         Family History:  History reviewed. No pertinent family history. Social History:  Social History     Tobacco Use    Smoking status: Current Every Day Smoker    Smokeless tobacco: Never Used   Substance Use Topics    Alcohol use: Yes    Drug use: No       Allergies:  No Known Allergies      Review of Systems   Review of Systems   Constitutional: Negative for chills and fever (subjective). HENT: Positive for sore throat. Negative for congestion, ear pain, facial swelling, sinus pressure, sinus pain and sneezing. Eyes: Negative for photophobia and visual disturbance. Respiratory: Positive for cough. Negative for shortness of breath. Cardiovascular: Negative for chest pain.    Gastrointestinal: Negative for abdominal pain, nausea and vomiting. Genitourinary: Negative for flank pain. Musculoskeletal: Positive for myalgias. Skin: Negative for color change, pallor, rash and wound. Neurological: Negative for dizziness, weakness, light-headedness and headaches. All other systems reviewed and are negative. Physical Exam   Physical Exam  Vitals signs and nursing note reviewed. Constitutional:       General: He is not in acute distress. Appearance: He is well-developed. Comments: Patient well-appearing in NAD   HENT:      Head: Normocephalic and atraumatic. Comments: No tonsillar or pharyngeal erythema, swelling or exudate    Eyes:      Conjunctiva/sclera: Conjunctivae normal.   Cardiovascular:      Rate and Rhythm: Normal rate and regular rhythm. Heart sounds: Normal heart sounds. Pulmonary:      Effort: Pulmonary effort is normal. No respiratory distress. Breath sounds: Normal breath sounds. Comments: Lungs CTA  Patient not working to breathe  Abdominal:      General: Bowel sounds are normal.      Palpations: Abdomen is soft. Tenderness: There is no tenderness. Comments: Abdomen soft nontender  Nondistended   Musculoskeletal: Normal range of motion. Skin:     General: Skin is warm. Findings: No rash. Neurological:      Mental Status: He is alert and oriented to person, place, and time. Cranial Nerves: No cranial nerve deficit.    Psychiatric:         Behavior: Behavior normal.         Diagnostic Study Results     Labs -     Recent Results (from the past 12 hour(s))   INFLUENZA A & B AG (RAPID TEST)    Collection Time: 01/15/20  5:26 PM   Result Value Ref Range    Influenza A Antigen NEGATIVE  NEG      Influenza B Antigen POSITIVE (A) NEG     STREP THROAT SCREEN    Collection Time: 01/15/20  5:26 PM   Result Value Ref Range    Special Requests: NO SPECIAL REQUESTS      Strep Screen NEGATIVE       Culture result: PENDING        Radiologic Studies -   XR CHEST PA LAT    (Results Pending)     CT Results  (Last 48 hours)    None        CXR Results  (Last 48 hours)    None          Medical Decision Making   I am the first provider for this patient. I reviewed the vital signs, available nursing notes, past medical history, past surgical history, family history and social history. Vital Signs-Reviewed the patient's vital signs. Patient Vitals for the past 12 hrs:   Temp Pulse Resp BP SpO2   01/15/20 1829 (!) 101.3 °F (38.5 °C) 88 14  97 %   01/15/20 1724 (!) 102.4 °F (39.1 °C) 97 19 133/87 95 %         Records Reviewed: Nursing Notes and Old Medical Records    Provider Notes (Medical Decision Making):   DDx: Influenza, PNA, bronchitis, URI, tonsillitis, pharyngitis    35 yo M with complaints of sore throat, cough, myalgias flu be positive. Fever decreasing with Tylenol and ibuprofen. Patient nontoxic-appearing and stable for outpatient management. ED Course:   Initial assessment performed. The patients presenting problems have been discussed, and they are in agreement with the care plan formulated and outlined with them. I have encouraged them to ask questions as they arise throughout their visit. Preliminary CXR interpreted by Fabi Garcias. Negative for PNA. Discussed influenza treatment patient patient declined Tamiflu. Discussed with patient the importance of controlling fever and fluid intake. Patient given strict instructions to return if he develops uncontrolled fever, uncontrolled vomiting, worsening symptoms. Disposition:  6:38 PM  Discussed lab and imaging results with pt along with dx and treatment plan. Discussed importance of PCP follow up. All questions answered. Pt voiced they understood. Return if sx worsen. PLAN:  1. Current Discharge Medication List      START taking these medications    Details   ibuprofen (MOTRIN) 800 mg tablet Take 1 Tab by mouth every six (6) hours as needed for Pain.   Qty: 20 Tab, Refills: 0      benzonatate (TESSALON PERLES) 100 mg capsule Take 1 Cap by mouth three (3) times daily as needed for Cough for up to 7 days. Qty: 21 Cap, Refills: 0      ondansetron (ZOFRAN ODT) 4 mg disintegrating tablet Take 1 Tab by mouth every eight (8) hours as needed for Nausea or Vomiting. Qty: 10 Tab, Refills: 0           2. Follow-up Information     Follow up With Specialties Details Why Contact Info    Paulie Schedule an appointment as soon as possible for a visit in 1 day  [address] [place] [id number] [phone]        Return to ED if worse     Diagnosis     Clinical Impression:   1. Influenza B        Attestations:    KRYSTIN York    Please note that this dictation was completed with [employer], the computer voice recognition software. Quite often unanticipated grammatical, syntax, homophones, and other interpretive errors are inadvertently transcribed by the computer software. Please disregard these errors. Please excuse any errors that have escaped final proofreading. Thank you.

## 2020-01-15 NOTE — DISCHARGE INSTRUCTIONS
Patient Education        Influenza (Flu): Care Instructions  Your Care Instructions    Influenza (flu) is an infection in the lungs and breathing passages. It is caused by the influenza virus. There are different strains, or types, of the flu virus from year to year. Unlike the common cold, the flu comes on suddenly and the symptoms, such as a cough, congestion, fever, chills, fatigue, aches, and pains, are more severe. These symptoms may last up to 10 days. Although the flu can make you feel very sick, it usually doesn't cause serious health problems. Home treatment is usually all you need for flu symptoms. But your doctor may prescribe antiviral medicine to prevent other health problems, such as pneumonia, from developing. Older people and those who have a long-term health condition, such as lung disease, are most at risk for having pneumonia or other health problems. Follow-up care is a key part of your treatment and safety. Be sure to make and go to all appointments, and call your doctor if you are having problems. It's also a good idea to know your test results and keep a list of the medicines you take. How can you care for yourself at home? · Get plenty of rest.  · Drink plenty of fluids, enough so that your urine is light yellow or clear like water. If you have kidney, heart, or liver disease and have to limit fluids, talk with your doctor before you increase the amount of fluids you drink. · Take an over-the-counter pain medicine if needed, such as acetaminophen (Tylenol), ibuprofen (Advil, Motrin), or naproxen (Aleve), to relieve fever, headache, and muscle aches. Read and follow all instructions on the label. No one younger than 20 should take aspirin. It has been linked to Reye syndrome, a serious illness. · Do not smoke. Smoking can make the flu worse. If you need help quitting, talk to your doctor about stop-smoking programs and medicines.  These can increase your chances of quitting for good.  · Breathe moist air from a hot shower or from a sink filled with hot water to help clear a stuffy nose. · Before you use cough and cold medicines, check the label. These medicines may not be safe for young children or for people with certain health problems. · If the skin around your nose and lips becomes sore, put some petroleum jelly on the area. · To ease coughing:  ? Drink fluids to soothe a scratchy throat. ? Suck on cough drops or plain hard candy. ? Take an over-the-counter cough medicine that contains dextromethorphan to help you get some sleep. Read and follow all instructions on the label. ? Raise your head at night with an extra pillow. This may help you rest if coughing keeps you awake. · Take any prescribed medicine exactly as directed. Call your doctor if you think you are having a problem with your medicine. To avoid spreading the flu  · Wash your hands regularly, and keep your hands away from your face. · Stay home from school, work, and other public places until you are feeling better and your fever has been gone for at least 24 hours. The fever needs to have gone away on its own without the help of medicine. · Ask people living with you to talk to their doctors about preventing the flu. They may get antiviral medicine to keep from getting the flu from you. · To prevent the flu in the future, get a flu vaccine every fall. Encourage people living with you to get the vaccine. · Cover your mouth when you cough or sneeze. When should you call for help? Call 911 anytime you think you may need emergency care.  For example, call if:    · You have severe trouble breathing.    Call your doctor now or seek immediate medical care if:    · You have new or worse trouble breathing.     · You seem to be getting much sicker.     · You feel very sleepy or confused.     · You have a new or higher fever.     · You get a new rash.    Watch closely for changes in your health, and be sure to contact your doctor if:    · You begin to get better and then get worse.     · You are not getting better after 1 week. Where can you learn more? Go to http://sanjeev-eloina.info/. Enter H726 in the search box to learn more about \"Influenza (Flu): Care Instructions. \"  Current as of: June 9, 2019  Content Version: 12.2  © 3396-1639 [employer]. Care instructions adapted under license by [employer] (which disclaims liability or warranty for this information). If you have questions about a medical condition or this instruction, always ask your healthcare professional. [place] [address] any warranty or liability for your use of this information.

## 2020-01-15 NOTE — LETTER
[facility] 
[address] [facility] EMERGENCY DEPT 
[address] [facility] [address] 
[phone] Work/School Note Date: 1/15/2020 To Whom It May concern: 
 
Analilia Mcdonnell was seen and treated today in the emergency room by the following provider(s): 
Attending Provider: Wanda Larios MD 
Physician Assistant: KRYSTIN Bowden. Analilia Mcdonnell may return to work on 1/20/2020. Sincerely, KRYSTIN Hung

## 2020-01-15 NOTE — LETTER
[facility] EMERGENCY DEPT 
[address] [facility] [id number] [phone] Work/School Note Date: 1/15/2020 To Whom It May concern: 
 
Sharlet Rubinstein was seen and treated today in the emergency room by the following provider(s): 
Physician Assistant: KRYSTIN Gross. Sharlet Rubinstein may return to work on 1/20/20. Sincerely, Tayler Jhaveri RN

## 2020-01-17 LAB
B-HEM STREP THROAT QL CULT: NEGATIVE
BACTERIA SPEC CULT: NORMAL
SERVICE CMNT-IMP: NORMAL